# Patient Record
Sex: MALE | Race: WHITE | Employment: FULL TIME | URBAN - NONMETROPOLITAN AREA
[De-identification: names, ages, dates, MRNs, and addresses within clinical notes are randomized per-mention and may not be internally consistent; named-entity substitution may affect disease eponyms.]

---

## 2019-04-29 ENCOUNTER — HOSPITAL ENCOUNTER (OUTPATIENT)
Dept: SLEEP CENTER | Age: 64
Discharge: HOME | End: 2019-04-29
Payer: COMMERCIAL

## 2019-04-29 PROCEDURE — 95806 SLEEP STUDY UNATT&RESP EFFT: CPT

## 2019-04-29 ASSESSMENT — SLEEP AND FATIGUE QUESTIONNAIRES
ESS TOTAL SCORE: 14
HOW LIKELY ARE YOU TO NOD OFF OR FALL ASLEEP IN A CAR, WHILE STOPPED FOR A FEW MINUTES IN TRAFFIC: 0
HOW LIKELY ARE YOU TO NOD OFF OR FALL ASLEEP WHILE SITTING QUIETLY AFTER LUNCH WITHOUT ALCOHOL: 2
HOW LIKELY ARE YOU TO NOD OFF OR FALL ASLEEP WHILE SITTING AND READING: 3
HOW LIKELY ARE YOU TO NOD OFF OR FALL ASLEEP WHILE SITTING INACTIVE IN A PUBLIC PLACE: 1
HOW LIKELY ARE YOU TO NOD OFF OR FALL ASLEEP WHILE WATCHING TV: 3
HOW LIKELY ARE YOU TO NOD OFF OR FALL ASLEEP WHEN YOU ARE A PASSENGER IN A CAR FOR AN HOUR WITHOUT A BREAK: 3
HOW LIKELY ARE YOU TO NOD OFF OR FALL ASLEEP WHILE LYING DOWN TO REST IN THE AFTERNOON WHEN CIRCUMSTANCES PERMIT: 2
HOW LIKELY ARE YOU TO NOD OFF OR FALL ASLEEP WHILE SITTING AND TALKING TO SOMEONE: 0

## 2019-05-02 NOTE — PROGRESS NOTES
[facility], [address] [place] [address]                                  SLEEP STUDY    PATIENT NAME: Chelita Barry                     :        1955  MED REC NO:   [id number]                              ROOM:  ACCOUNT NO:   [de-identified]                           ADMIT DATE: 2019  PROVIDER:     [facility]    SLEEP IMPROVEMENT [place]  INTERPRETATION OF CLINICAL POLYSOMNOGRAPHY    DATE OF STUDY:  2019    REFERRING PROVIDER:  Samantha Shepard MD    CLINICAL DIAGNOSES:  1.  Obstructive sleep apnea syndrome. 2.  Systemic hypertension. PROCEDURE STANDARD:  It was a one-night ApneaLink home sleep study. PROCEDURE:  The sleep/wake evaluation consisted of an intensive intake  interview, 1 night of home sleep testing. The standard montage for Home Sleep Testing included the [address] [address] [facility] [address]. The respiratory battery consisted of measurements of simultaneous  recording of ventilation (oronasal thermal airflow), respiratory effort  (single thoracoabdominal piezo belt), ECG or heart rate, oxygen  saturation (finger oximetry). The sleep study was started at 11:17 p.m. and finished at 05:57 a.m. for  a total recording time of 6 hours and 40 minutes and total monitoring  time of 6 hours and 24 minutes. The apnea/hypopnea index was 20 per hour. The respiratory disturbance  index was 24 per hour. Lowest oxygen saturation during the night being  85%. Baseline O2 was 96%. No other abnormality was detected. IMPRESSION:  1. Obstructive sleep apnea syndrome. 2.  Systemic hypertension. RECOMMENDATIONS:  1. The patient has moderate degree of sleep apnea syndrome. It is  recommended that the patient should return to the Sleep Center for  treatment with nasal CPAP or BiPAP thereby relieving the apnea should  improve his daytime symptoms and also protect the patient from the  morbidity associated with the apnea.   2.  The patient should be encouraged to lose weight. 3.  The patient should be instructed on proper sleep hygiene techniques. 4.  The patient should be advised not to consume any alcohol or  hypnotics at bedtime, which could worsen the apnea.         Yessy Ramon    D:05/01/2019 11:01:17               T: 05/02/2019 2:13:39      [email or web address]  Job#: [id number]     Doc#: [id number]    CC:  Tammy Dumont

## 2022-02-06 ENCOUNTER — HOSPITAL ENCOUNTER (EMERGENCY)
Age: 67
Discharge: HOME | End: 2022-02-06
Payer: MEDICARE

## 2022-02-06 ENCOUNTER — APPOINTMENT (OUTPATIENT)
Dept: GENERAL RADIOLOGY | Age: 67
End: 2022-02-06
Payer: MEDICARE

## 2022-02-06 VITALS
OXYGEN SATURATION: 100 % | DIASTOLIC BLOOD PRESSURE: 92 MMHG | SYSTOLIC BLOOD PRESSURE: 148 MMHG | BODY MASS INDEX: 22.05 KG/M2 | HEART RATE: 72 BPM | RESPIRATION RATE: 16 BRPM | WEIGHT: 145 LBS | TEMPERATURE: 97.1 F

## 2022-02-06 DIAGNOSIS — S61.412A LACERATION OF LEFT HAND WITHOUT FOREIGN BODY, INITIAL ENCOUNTER: Primary | ICD-10-CM

## 2022-02-06 PROCEDURE — 99282 EMERGENCY DEPT VISIT SF MDM: CPT

## 2022-02-06 PROCEDURE — 73130 X-RAY EXAM OF HAND: CPT

## 2022-02-06 PROCEDURE — 90471 IMMUNIZATION ADMIN: CPT | Performed by: PHYSICIAN ASSISTANT

## 2022-02-06 PROCEDURE — 6360000002 HC RX W HCPCS: Performed by: PHYSICIAN ASSISTANT

## 2022-02-06 PROCEDURE — 90715 TDAP VACCINE 7 YRS/> IM: CPT | Performed by: PHYSICIAN ASSISTANT

## 2022-02-06 PROCEDURE — 12002 RPR S/N/AX/GEN/TRNK2.6-7.5CM: CPT

## 2022-02-06 RX ORDER — CEPHALEXIN 500 MG/1
500 CAPSULE ORAL 2 TIMES DAILY
Qty: 20 CAPSULE | Refills: 0 | Status: SHIPPED | OUTPATIENT
Start: 2022-02-06 | End: 2022-02-16

## 2022-02-06 RX ADMIN — TETANUS TOXOID, REDUCED DIPHTHERIA TOXOID AND ACELLULAR PERTUSSIS VACCINE, ADSORBED 0.5 ML: 5; 2.5; 8; 8; 2.5 SUSPENSION INTRAMUSCULAR at 12:50

## 2022-02-06 ASSESSMENT — ENCOUNTER SYMPTOMS
RESPIRATORY NEGATIVE: 1
GASTROINTESTINAL NEGATIVE: 1
EYES NEGATIVE: 1
ROS SKIN COMMENTS: LEFT HAND LACERATION

## 2022-02-06 ASSESSMENT — PAIN DESCRIPTION - ORIENTATION: ORIENTATION: LEFT

## 2022-02-06 ASSESSMENT — PAIN DESCRIPTION - LOCATION: LOCATION: HAND

## 2022-02-06 ASSESSMENT — PAIN SCALES - GENERAL: PAINLEVEL_OUTOF10: 2

## 2022-02-06 NOTE — ED NOTES
Dressing applied to this pt left posterior hand. No drainage noted. Pt educated on wound care and follow up along with medications.      Jerad Andrea RN  02/06/22 5818

## 2022-02-06 NOTE — ED PROVIDER NOTES
[facility] ED  EMERGENCY DEPARTMENT ENCOUNTER      Pt Name: Rosa Elena Rubalcava  MRN: [id number]  [place] 1955  Date of evaluation: 2/6/2022  Provider: ZOILA Stokes PA-C    CHIEF COMPLAINT     Chief Complaint   Patient presents with    Laceration     left hand. caught by drill bit. HISTORY OF PRESENT ILLNESS   (Location/Symptom, Timing/Onset, Context/Setting,Quality, Duration, Modifying Factors, Severity)  Note limiting factors. Rosa Elena Rubalcava is a69 y.o. male who presents to the emergency department      Healthy 55-year-old male presents here with a 6 cm laceration to the left hand. He was using a drill bit at home excellently lost control and caught his hand. Superficial laceration question dorsal aspect of left thumb across the webspace region. Patient is right-hand dominant. Son updated tetanus. Injury occurred just prior to arrival.  Bleeding is controlled. Nursing Notes werereviewed. REVIEW OF SYSTEMS    (2-9 systems for level 4, 10 or more for level 5)     Review of Systems   Constitutional: Negative. HENT: Negative. Eyes: Negative. Respiratory: Negative. Cardiovascular: Negative. Gastrointestinal: Negative. Endocrine: Negative. Genitourinary: Negative. Musculoskeletal: Negative. Skin: Positive for wound. Left hand laceration   Neurological: Negative. Psychiatric/Behavioral: Negative. All other systems reviewed and are negative. Except as noted above the remainder of the review of systems was reviewed and negative.        PAST MEDICAL HISTORY     Past Medical History:   Diagnosis Date    History of basal cell carcinoma 2000    removed from the back    Hyperlipidemia     Hypertension 06/2018    Impaired fasting glucose 11/2017    Reflux esophagitis          SURGICALHISTORY       Past Surgical History:   Procedure Laterality Date    COLONOSCOPY  2006    dr Samaria Hoff  2012    Dr. Sherly Balderrama - no polyps    SKIN CANCER EXCISION  2000    BCC x3 times, last one from the back in the early 2000's   [address]       Discharge Medication List as of 2/6/2022  1:18 PM      CONTINUE these medications which have NOT CHANGED    Details   omeprazole (PRILOSEC) 40 MG delayed release capsule Take 1 capsule by mouth daily, Disp-90 capsule, R-3Normal      amLODIPine (NORVASC) 5 MG tablet TAKE 1 TABLET BY MOUTH EVERY DAY, Disp-90 tablet, R-3Normal               ALLERGIES   Amoxicillin    FAMILY HISTORY       Family History   Problem Relation Age of Onset    Cancer Father         prostate    High Cholesterol Father     High Cholesterol Brother           SOCIAL HISTORY       Social History     Socioeconomic History    Marital status:      Spouse name: None    Number of children: None    Years of education: None    Highest education level: None   Occupational History    None   Tobacco Use    Smoking status: Never Smoker    Smokeless tobacco: Never Used   Substance and Sexual Activity    Alcohol use: No    Drug use: Never    Sexual activity: None   Other Topics Concern    None   Social History Narrative    None     Social Determinants of Health     Financial Resource Strain: Low Risk     Difficulty of Paying Living Expenses: Not hard at all   Food Insecurity: No Food Insecurity    Worried About Running Out of Food in the Last Year: Never true    Kris of Food in the Last Year: Never true   Transportation Needs:     Lack of Transportation (Medical): Not on file    Lack of Transportation (Non-Medical):  Not on file   Physical Activity:     Days of Exercise per Week: Not on file    Minutes of Exercise per Session: Not on file   Stress:     Feeling of Stress : Not on file   Social Connections:     Frequency of Communication with Friends and Family: Not on file    Frequency of Social Gatherings with Friends and Family: Not on file    Attends Zoroastrian Services: Not on file   Blessing Active Member of Clubs or Organizations: Not on file    Attends Club or Organization Meetings: Not on file    Marital Status: Not on file   Intimate Partner Violence:     Fear of Current or Ex-Partner: Not on file    Emotionally Abused: Not on file    Physically Abused: Not on file    Sexually Abused: Not on file   Housing Stability:     Unable to Pay for Housing in the Last Year: Not on file    Number of [place] in the Last Year: Not on file    Unstable Housing in the Last Year: Not on file       SCREENINGS    Mariana Coma Scale  Eye Opening: Spontaneous  Best Verbal Response: Oriented  Best Motor Response: Obeys commands  Mariana Coma Scale Score: 15        PHYSICAL EXAM    (up to 7 for level 4, 8 or more for level 5)     ED Triage Vitals   BP Temp Temp Source Pulse Resp SpO2 Height Weight   02/06/22 1234 02/06/22 1233 02/06/22 1233 02/06/22 1233 02/06/22 1233 02/06/22 1233 -- 02/06/22 1233   (!) 148/92 97.1 °F (36.2 °C) Tympanic 72 16 100 %  145 lb (65.8 kg)       Physical Exam  Vitals and nursing note reviewed. Constitutional:       Appearance: Normal appearance. HENT:      Head: Normocephalic and atraumatic. Right Ear: Tympanic membrane and ear canal normal.      Left Ear: Tympanic membrane and ear canal normal.      Nose: Nose normal.      Mouth/Throat:      Mouth: Mucous membranes are dry. Pharynx: Oropharynx is clear. Cardiovascular:      Rate and Rhythm: Normal rate and regular rhythm. Pulmonary:      Effort: Pulmonary effort is normal.      Breath sounds: Normal breath sounds. Musculoskeletal:         General: Normal range of motion. Cervical back: Normal range of motion and neck supple. Skin:     General: Skin is warm and dry. Capillary Refill: Capillary refill takes 2 to 3 seconds. Comments: 5cm left hand superficial laceration. No deep structure damage full range of motion subcutaneous fat is exposed no acute foreign body noted initially. Neurological:      General: No focal deficit present. Mental Status: He is alert and oriented to person, place, and time. Mental status is at baseline. Psychiatric:         Mood and Affect: Mood normal.         Behavior: Behavior normal.         Thought Content: Thought content normal.         Judgment: Judgment normal.         DIAGNOSTIC RESULTS     EKG: All EKG's are interpreted by the Emergency Department Physician who either signs orCo-signs this chart in the absence of a cardiologist.      RADIOLOGY:   Non-plainfilm images such as CT, Ultrasound and MRI are read by the radiologist. Plain radiographic images are visualized and preliminarily interpreted by the emergency physician with the below findings:      Interpretationper the Radiologist below, if available at the time of this note:    XR HAND LEFT (MIN 3 VIEWS)   Final Result   No acute osseous abnormality. Minimal degenerative changes. No radiopaque   foreign bodies. ED BEDSIDE ULTRASOUND:   Performed by ED Physician - none    LABS:  Labs Reviewed - No data to display    All other labs were within normal range or not returned as of this dictation. EMERGENCY DEPARTMENT COURSE and DIFFERENTIAL DIAGNOSIS/MDM:   Vitals:    Vitals:    02/06/22 1233 02/06/22 1234   BP:  (!) 148/92   Pulse: 72    Resp: 16    Temp: 97.1 °F (36.2 °C)    TempSrc: Tympanic    SpO2: 100%    Weight: 145 lb (65.8 kg)          MDM  Number of Diagnoses or Management Options  Laceration of left hand without foreign body, initial encounter  Diagnosis management comments: Left hand laceration was anesthetized with 1% lidocaine solution locally cleaned with Betadine normal saline. Area showed no acute foreign body x-ray shows no acute foreign body or fractures. Patient had full range of motion no deep structure damage noted. Area was vigorously irrigated with normal saline.   Wound was then reapproximated with 4-0 Ethilon suture times 1 running subcuticular stitch 2 simple stitches were then used at the end to help secure the laceration. Dressing applied by nursing staff. Wound care was instructed to the patient. Instructed to follow-up with his primary care physician 10 days for suture removal.  Patient provided with a prescription for Keflex. Denies need for pain medicines. Patient is right-hand dominant. He verbalized understanding agrees with plan of care. Patient was updated on his tetanus immunization            Procedures    FINAL IMPRESSION      1. Laceration of left hand without foreign body, initial encounter        DISPOSITION/PLAN   DISPOSITION        PATIENT REFERRED TO:  Jose Enrique Rogers MD  [address] [facility] [address]  [phone]    In 10 days  For suture removal      DISCHARGE MEDICATIONS:  Discharge Medication List as of 2/6/2022  1:18 PM      START taking these medications    Details   cephALEXin (KEFLEX) 500 MG capsule Take 1 capsule by mouth 2 times daily for 10 days, Disp-20 capsule, R-0Normal                    Summation      Patient Course:      ED Medications administered this visit:    Medications   tetanus-diphth-acell pertussis (BOOSTRIX) injection 0.5 mL (0.5 mLs IntraMUSCular Given 2/6/22 1250)       New Prescriptions from this visit:    Discharge Medication List as of 2/6/2022  1:18 PM      START taking these medications    Details   cephALEXin (KEFLEX) 500 MG capsule Take 1 capsule by mouth 2 times daily for 10 days, Disp-20 capsule, R-0Normal             Follow-up:  Jose Enrique Rogers MD  [address] [facility] [address]  [phone]    In 10 days  For suture removal        Final Impression:   1.  Laceration of left hand without foreign body, initial encounter               (Please note that portions of this note were completed with a voice recognition program.  Efforts were made to edit the dictations but occasionally words are mis-transcribed.)         India Vance PA-C  02/06/22 5485

## 2022-09-14 ENCOUNTER — TELEPHONE (OUTPATIENT)
Dept: GASTROENTEROLOGY | Age: 67
End: 2022-09-14

## 2022-09-14 DIAGNOSIS — Z01.818 PRE-OP TESTING: Primary | ICD-10-CM

## 2022-09-14 RX ORDER — POLYETHYLENE GLYCOL 3350, SODIUM SULFATE ANHYDROUS, SODIUM BICARBONATE, SODIUM CHLORIDE, POTASSIUM CHLORIDE 236; 22.74; 6.74; 5.86; 2.97 G/4L; G/4L; G/4L; G/4L; G/4L
4 POWDER, FOR SOLUTION ORAL ONCE
Qty: 4000 ML | Refills: 0 | Status: SHIPPED | OUTPATIENT
Start: 2022-09-14 | End: 2022-09-14

## 2022-09-14 NOTE — TELEPHONE ENCOUNTER
Contacted patient to complete colonoscopy direct access questionnaire - patient agreeable       1. Is the patient's age greater than or equal to 68 years? No     2. Is the patient's BMI greater than or equal to 44. 9? No     3. Does the patient have any significant or new GI symptoms? No     4. Does the patient have any significant medical illness/conditions? (heart,lung,renal,liver)  HTN     5. Does the patient have an AICD/Cardiac Defibrillator? No     6. Is the patient on anti-thrombotic (I.e. Coumadin) or anti-platelet (I.e. Plavix) medication? No     7. Is the patient on home oxygen or use a Bi-Pap? Cpap for sleep apnea     8. Does the patient have insulin dependent diabetes? No     9. Does the patient require an ? No     10. Does the patient have a history of neck radiation or radical neck surgery? No     11. Is the patient on dialysis? No     12. Does the patient have any major cognitive impairments? No     13. Does the patient have Family history of colon cancer? No     14. When was patient's last colonoscopy? 10 years ago    Scheduled for Dec. 14th - prefers early AM  EKG - needed and verbalized understanding  [employer] [facility] - [facility]  Verified mailing address - prep instructions mailed  Surgery notified.

## 2022-11-02 NOTE — TELEPHONE ENCOUNTER
Rescheduled 1.4.23 colonoscopy due to provider unavailable to November 23rd 2022. States has prep instructions, Rx picked up and only need to complete EKG yet. Agreed to complete that in next week or two. No further questions.

## 2023-04-26 ENCOUNTER — HOSPITAL ENCOUNTER (OUTPATIENT)
Age: 68
Discharge: HOME | End: 2023-04-26
Payer: MEDICARE

## 2023-04-26 DIAGNOSIS — Z01.818 PRE-OP TESTING: ICD-10-CM

## 2023-04-26 LAB
EKG ATRIAL RATE: 57 BPM
EKG P AXIS: 69 DEGREES
EKG P-R INTERVAL: 266 MS
EKG Q-T INTERVAL: 382 MS
EKG QRS DURATION: 90 MS
EKG QTC CALCULATION (BAZETT): 371 MS
EKG R AXIS: 45 DEGREES
EKG T AXIS: 55 DEGREES
EKG VENTRICULAR RATE: 57 BPM

## 2023-04-26 PROCEDURE — 93005 ELECTROCARDIOGRAM TRACING: CPT

## 2023-04-28 ENCOUNTER — TELEPHONE (OUTPATIENT)
Dept: PREADMISSION TESTING | Age: 68
End: 2023-04-28

## 2023-05-02 ENCOUNTER — ANESTHESIA EVENT (OUTPATIENT)
Dept: OPERATING ROOM | Age: 68
End: 2023-05-02
Payer: MEDICARE

## 2023-05-03 ENCOUNTER — HOSPITAL ENCOUNTER (OUTPATIENT)
Age: 68
Setting detail: OUTPATIENT SURGERY
Discharge: HOME | End: 2023-05-03
Attending: INTERNAL MEDICINE | Admitting: INTERNAL MEDICINE
Payer: MEDICARE

## 2023-05-03 ENCOUNTER — ANESTHESIA (OUTPATIENT)
Dept: OPERATING ROOM | Age: 68
End: 2023-05-03
Payer: MEDICARE

## 2023-05-03 VITALS
RESPIRATION RATE: 16 BRPM | DIASTOLIC BLOOD PRESSURE: 84 MMHG | SYSTOLIC BLOOD PRESSURE: 136 MMHG | OXYGEN SATURATION: 100 % | TEMPERATURE: 97.1 F | WEIGHT: 145 LBS | HEIGHT: 68 IN | BODY MASS INDEX: 21.98 KG/M2 | HEART RATE: 66 BPM

## 2023-05-03 DIAGNOSIS — Z12.11 SCREENING FOR COLON CANCER: ICD-10-CM

## 2023-05-03 PROBLEM — Q43.8 REDUNDANT COLON: Status: ACTIVE | Noted: 2023-05-03

## 2023-05-03 PROBLEM — K64.8 INTERNAL HEMORRHOIDS: Status: ACTIVE | Noted: 2023-05-03

## 2023-05-03 PROCEDURE — 2500000003 HC RX 250 WO HCPCS: Performed by: NURSE ANESTHETIST, CERTIFIED REGISTERED

## 2023-05-03 PROCEDURE — 3700000000 HC ANESTHESIA ATTENDED CARE: Performed by: INTERNAL MEDICINE

## 2023-05-03 PROCEDURE — 6360000002 HC RX W HCPCS: Performed by: NURSE ANESTHETIST, CERTIFIED REGISTERED

## 2023-05-03 PROCEDURE — 45380 COLONOSCOPY AND BIOPSY: CPT | Performed by: INTERNAL MEDICINE

## 2023-05-03 PROCEDURE — 7100000010 HC PHASE II RECOVERY - FIRST 15 MIN: Performed by: INTERNAL MEDICINE

## 2023-05-03 PROCEDURE — 7100000011 HC PHASE II RECOVERY - ADDTL 15 MIN: Performed by: INTERNAL MEDICINE

## 2023-05-03 PROCEDURE — 2709999900 HC NON-CHARGEABLE SUPPLY: Performed by: INTERNAL MEDICINE

## 2023-05-03 PROCEDURE — 88305 TISSUE EXAM BY PATHOLOGIST: CPT

## 2023-05-03 PROCEDURE — 3700000001 HC ADD 15 MINUTES (ANESTHESIA): Performed by: INTERNAL MEDICINE

## 2023-05-03 PROCEDURE — 3609010600 HC COLONOSCOPY POLYPECTOMY SNARE/COLD BIOPSY: Performed by: INTERNAL MEDICINE

## 2023-05-03 PROCEDURE — 2580000003 HC RX 258: Performed by: NURSE ANESTHETIST, CERTIFIED REGISTERED

## 2023-05-03 RX ORDER — LIDOCAINE HYDROCHLORIDE 20 MG/ML
INJECTION, SOLUTION EPIDURAL; INFILTRATION; INTRACAUDAL; PERINEURAL PRN
Status: DISCONTINUED | OUTPATIENT
Start: 2023-05-03 | End: 2023-05-03 | Stop reason: SDUPTHER

## 2023-05-03 RX ORDER — SODIUM CHLORIDE 0.9 % (FLUSH) 0.9 %
5-40 SYRINGE (ML) INJECTION EVERY 12 HOURS SCHEDULED
Status: DISCONTINUED | OUTPATIENT
Start: 2023-05-03 | End: 2023-05-03 | Stop reason: HOSPADM

## 2023-05-03 RX ORDER — PROPOFOL 10 MG/ML
INJECTION, EMULSION INTRAVENOUS CONTINUOUS PRN
Status: DISCONTINUED | OUTPATIENT
Start: 2023-05-03 | End: 2023-05-03 | Stop reason: SDUPTHER

## 2023-05-03 RX ORDER — SODIUM CHLORIDE 9 MG/ML
INJECTION, SOLUTION INTRAVENOUS PRN
Status: DISCONTINUED | OUTPATIENT
Start: 2023-05-03 | End: 2023-05-03 | Stop reason: HOSPADM

## 2023-05-03 RX ORDER — SODIUM CHLORIDE, SODIUM LACTATE, POTASSIUM CHLORIDE, CALCIUM CHLORIDE 600; 310; 30; 20 MG/100ML; MG/100ML; MG/100ML; MG/100ML
INJECTION, SOLUTION INTRAVENOUS CONTINUOUS
Status: DISCONTINUED | OUTPATIENT
Start: 2023-05-03 | End: 2023-05-03 | Stop reason: HOSPADM

## 2023-05-03 RX ORDER — PROPOFOL 10 MG/ML
INJECTION, EMULSION INTRAVENOUS PRN
Status: DISCONTINUED | OUTPATIENT
Start: 2023-05-03 | End: 2023-05-03 | Stop reason: SDUPTHER

## 2023-05-03 RX ORDER — SODIUM CHLORIDE 0.9 % (FLUSH) 0.9 %
5-40 SYRINGE (ML) INJECTION PRN
Status: DISCONTINUED | OUTPATIENT
Start: 2023-05-03 | End: 2023-05-03 | Stop reason: HOSPADM

## 2023-05-03 RX ADMIN — PROPOFOL 180 MCG/KG/MIN: 10 INJECTION, EMULSION INTRAVENOUS at 08:24

## 2023-05-03 RX ADMIN — PROPOFOL 80 MG: 10 INJECTION, EMULSION INTRAVENOUS at 08:24

## 2023-05-03 RX ADMIN — LIDOCAINE HYDROCHLORIDE 100 MG: 20 INJECTION, SOLUTION EPIDURAL; INFILTRATION; INTRACAUDAL; PERINEURAL at 08:24

## 2023-05-03 RX ADMIN — SODIUM CHLORIDE, POTASSIUM CHLORIDE, SODIUM LACTATE AND CALCIUM CHLORIDE: 600; 310; 30; 20 INJECTION, SOLUTION INTRAVENOUS at 07:32

## 2023-05-03 ASSESSMENT — PAIN - FUNCTIONAL ASSESSMENT: PAIN_FUNCTIONAL_ASSESSMENT: 0-10

## 2023-05-03 ASSESSMENT — PAIN SCALES - GENERAL: PAINLEVEL_OUTOF10: 0

## 2023-05-03 NOTE — H&P
Narrative    Not on file     Social Determinants of Health     Financial Resource Strain: Low Risk     Difficulty of Paying Living Expenses: Not hard at all   Food Insecurity: No Food Insecurity    Worried About Running Out of Food in the Last Year: Never true    Ran Out of Food in the Last Year: Never true   Transportation Needs: Not on file   Physical Activity: Insufficiently Active    Days of Exercise per Week: 3 days    Minutes of Exercise per Session: 40 min   Stress: Not on file   Social Connections: Not on file   Intimate Partner Violence: Not on file   Housing Stability: Not on file     ROS: Non-contributory    Physical Exam:  Vitals:    05/03/23 0721   BP: (!) 150/92   Pulse: 71   Resp: 16   Temp: 97.5 °F (36.4 °C)   SpO2: 100%       Chest: Breath sounds were clear and equal with no rales, wheezes, or rhonchi. Respiratory effort was normal with no retractions or use of accessory muscles. Cardiovascular: Heart sounds were normal with a regular rate and rhythm. There were no murmurs, gallops or rubs. Abdomen: Bowel sounds were normal.  The abdomen was soft and non distended. There was no tenderness, guarding, rebound, or rigidity. There were no masses, hepatosplenomegaly, or hernias. ASSESSMENT:    Wolfgang Beatty is a  78year old woman with a past medical history of hypertension and hyperlipidemia who presents with colon cancer screening. ASA 2, Mallamapti score 2. Plan:  Colonoscopy    VERIFICATION OF CONSENT    The patient was counseled regarding the procedure, its indications, risks, potential complications and alternatives. Any questions were answered.  Consent was obtained    Electronically signed by John Barrios MD on 5/3/2023 at 8:11 AM

## 2023-05-03 NOTE — ANESTHESIA POSTPROCEDURE EVALUATION
Department of Anesthesiology  Postprocedure Note    Patient: Wali Pineda  MRN: [id number]  YOB: 1955  Date of evaluation: 5/3/2023      Procedure Summary     Date: 05/03/23 Room / Location: [address] [place] [address]    Anesthesia Start: 4949 Anesthesia Stop: 4070    Procedure: COLONOSCOPY POLYPECTOMY COLD BIOPSY (Abdomen) Diagnosis:       Screening for colon cancer      (SCREENING)    Surgeons: Mick Gibbs MD Responsible Provider: XANDER Dangelo CRNA    Anesthesia Type: general ASA Status: 2          Anesthesia Type: No value filed.     Niru Phase I: Niru Score: 6    Niru Phase II: Niru Score: 10      Anesthesia Post Evaluation    Patient location during evaluation: bedside  Patient participation: complete - patient participated  Level of consciousness: awake and alert  Pain score: 0  Airway patency: patent  Nausea & Vomiting: no nausea and no vomiting  Complications: no  Cardiovascular status: hemodynamically stable  Respiratory status: acceptable  Hydration status: stable

## 2023-05-03 NOTE — PROGRESS NOTES
IV Sedation Discharge Criteria    Inpatients must meet Criteria 1 through 7. All other patients are either YES or N/A. If a NO is chosen then Surgeon must be notified. 1.  Minimum 30 minutes after last dose of sedative medication, minimum 120 minutes after last dose of reversal agent. Yes      2. Systolic BP stable within 20 mmHg for 30 minutes & systolic BP between 90 & 592 or within 10 mmHg of baseline. Yes      3. Pulse between 60 and 100 or within 10 bpm of baseline. Yes      4. Spontaneous respiratory rate >/= 10 per minute. Yes      5. SaO2 >/= 95 or  >/= baseline. Yes      6. Able to cough and swallow or return to baseline function. Yes      7. Alert and oriented or return to baseline mental status. Yes      8. Demonstrates controlled, coordinated movements, ambulates with steady gait, or return to baseline activity function. Yes      9. Minimal or no pain or nausea, or at a level tolerable and acceptable to patient. Yes      10. Takes and retains oral fluids as allowed. Yes      11. Procedural / perioperative site stable. Minimal or no bleeding. Yes          12. If GI endoscopy procedure, minimal or no abdominal distention or passing flatus. Yes      13. Written discharge instructions and emergency telephone number provided. Yes      14. Accompanied by a responsible adult.     Yes

## 2023-05-03 NOTE — OP NOTE
[facility]    COLONOSCOPY    PROCEDURE DATE: 05/03/23    REFERRING PHYSICIAN: No ref. provider found     PRIMARY CARE PROVIDER: Emma Chavez MD    ATTENDING PHYSICIAN: Ric Nj MD     HISTORY: Mr. Russel Carter is a 78 y.o. male who presents to the  endoscopy unit for colonoscopy. The patient's clinical history is remarkable for hyperlipidemia. He is currently medically stable and appropriate for the planned procedure. PREOPERATIVE DIAGNOSIS: screening for colon cancer. PROCEDURES:   Transanal Colonoscopy --screening. POSTPROCEDURE DIAGNOSIS:  Internal hemorrhoids  Redundant colon    MEDICATIONS: MAC per anesthesia     EBL 2 ml      INSTRUMENT: Olympus CF-H190 AL Pediatric flexible Colonoscope. PREPARATION: The nature and character of the procedure as well as risks, benefits, and alternatives were discussed with the patient and informed consent was obtained. Complications were said to include, but were not limited to: medication allergy, medication reaction, cardiovascular and respiratory problems, bleeding, perforation, infection, and/or missed diagnosis. Following arrival in the endoscopy room, the patient was placed in the left lateral decubitus position and final time-out accomplished in the presence of the nursing staff. Baseline vital signs were obtained and reviewed, and IV sedation was subsequently initiated. FINDINGS: Rectal examination demonstrated no significant visible external abnormality and digital palpation was unremarkable. Following adequate conscious sedation the colonoscope was introduced and advanced under direct visualization to the cecum, which was identified by the ileocecal valve and appendiceal orifice. The bowel preparation was felt to be fair in transverse to cecum. Cecal intubation time was 5 minutes. Once maximally inserted, the endoscope was withdrawn and the mucosa was carefully inspected.  The mucosal exam revealed polypoid

## 2023-05-03 NOTE — ANESTHESIA PRE PROCEDURE
Resp: 16   Temp: 36.4 °C (97.5 °F)   TempSrc: Temporal   SpO2: 100%   Weight: 145 lb (65.8 kg)   Height: 5' 8\" (1.727 m)                                              BP Readings from Last 3 Encounters:   05/03/23 (!) 150/92   12/22/22 (!) 146/88   06/21/22 128/80       NPO Status: Time of last liquid consumption: 0000                        Time of last solid consumption: 1800                        Date of last liquid consumption: 05/03/23                        Date of last solid food consumption: 05/01/23    BMI:   Wt Readings from Last 3 Encounters:   05/03/23 145 lb (65.8 kg)   12/22/22 154 lb 9.6 oz (70.1 kg)   06/21/22 150 lb (68 kg)     Body mass index is 22.05 kg/m². CBC:   Lab Results   Component Value Date/Time    WBC 5.4 12/14/2022 07:35 AM    RBC 4.57 12/14/2022 07:35 AM    HGB 15.2 12/14/2022 07:35 AM    HCT 43.5 12/14/2022 07:35 AM    MCV 95.2 12/14/2022 07:35 AM    RDW 11.9 12/14/2022 07:35 AM     12/14/2022 07:35 AM       CMP:   Lab Results   Component Value Date/Time     12/14/2022 07:35 AM    K 4.6 12/14/2022 07:35 AM     12/14/2022 07:35 AM    CO2 27 12/14/2022 07:35 AM    BUN 20 12/14/2022 07:35 AM    CREATININE 0.90 12/14/2022 07:35 AM    GLUCOSE 102 12/14/2022 07:35 AM    CALCIUM 9.5 12/14/2022 07:35 AM    AST 23 12/14/2022 07:35 AM    ALT 14 12/14/2022 07:35 AM       POC Tests: No results for input(s): POCGLU, POCNA, POCK, POCCL, POCBUN, POCHEMO, POCHCT in the last 72 hours.     Coags: No results found for: PROTIME, INR, APTT    HCG (If Applicable): No results found for: PREGTESTUR, PREGSERUM, HCG, HCGQUANT     ABGs: No results found for: PHART, PO2ART, GRC7CRG, YHL6EAY, BEART, D6ABGDSL     Type & Screen (If Applicable):  No results found for: LABABO, LABRH    Drug/Infectious Status (If Applicable):  Lab Results   Component Value Date/Time    HEPCAB NEGATIVE 12/17/2018 07:34 AM       COVID-19 Screening (If Applicable): No results found for: COVID19        Anesthesia

## 2023-05-04 LAB — SURGICAL PATHOLOGY REPORT: NORMAL

## 2025-05-20 ENCOUNTER — HOSPITAL ENCOUNTER (OUTPATIENT)
Age: 70
Setting detail: SPECIMEN
Discharge: HOME | End: 2025-05-20
Payer: MEDICARE

## 2025-05-20 DIAGNOSIS — R19.7 DIARRHEA, UNSPECIFIED TYPE: ICD-10-CM

## 2025-05-20 LAB
C DIFF GDH + TOXINS A+B STL QL IA.RAPID: NEGATIVE
SPECIMEN DESCRIPTION: NORMAL

## 2025-05-20 PROCEDURE — 87449 NOS EACH ORGANISM AG IA: CPT

## 2025-05-20 PROCEDURE — 87506 IADNA-DNA/RNA PROBE TQ 6-11: CPT

## 2025-05-20 PROCEDURE — 87324 CLOSTRIDIUM AG IA: CPT

## 2025-05-21 LAB
CAMPYLOBACTER DNA SPEC NAA+PROBE: NORMAL
ETEC ELTA+ESTB GENES STL QL NAA+PROBE: NORMAL
P SHIGELLOIDES DNA STL QL NAA+PROBE: NORMAL
SALMONELLA DNA SPEC QL NAA+PROBE: NORMAL
SHIGA TOXIN STX GENE SPEC NAA+PROBE: NORMAL
SHIGELLA DNA SPEC QL NAA+PROBE: NORMAL
SPECIMEN DESCRIPTION: NORMAL
V CHOL+PARA RFBL+TRKH+TNAA STL QL NAA+PR: NORMAL
Y ENTERO RECN STL QL NAA+PROBE: NORMAL

## (undated) DEVICE — TUBING SUCT NON-STRL 9/32X100 W/CNNT

## (undated) DEVICE — CANNULA ORAL NSL AD CO2 N INTUB O2 DEL DISP TRU LNK

## (undated) DEVICE — SOLUTION IV IRRIG POUR BRL 0.9% SODIUM CHL 2F7124

## (undated) DEVICE — FORCEPS BX L240CM JAW DIA2.8MM L CAP W/ NDL MIC MESH TOOTH